# Patient Record
Sex: MALE | Race: OTHER | NOT HISPANIC OR LATINO | Employment: UNEMPLOYED | URBAN - NONMETROPOLITAN AREA
[De-identification: names, ages, dates, MRNs, and addresses within clinical notes are randomized per-mention and may not be internally consistent; named-entity substitution may affect disease eponyms.]

---

## 2024-04-23 ENCOUNTER — HOSPITAL ENCOUNTER (EMERGENCY)
Facility: HOSPITAL | Age: 39
Discharge: HOME | End: 2024-04-24
Attending: EMERGENCY MEDICINE
Payer: COMMERCIAL

## 2024-04-23 ENCOUNTER — APPOINTMENT (EMERGENCY)
Dept: RADIOLOGY | Facility: HOSPITAL | Age: 39
End: 2024-04-23
Payer: COMMERCIAL

## 2024-04-23 DIAGNOSIS — J18.9 PNEUMONIA: ICD-10-CM

## 2024-04-23 DIAGNOSIS — R53.1 WEAKNESS: Primary | ICD-10-CM

## 2024-04-23 DIAGNOSIS — R05.9 COUGH: ICD-10-CM

## 2024-04-23 DIAGNOSIS — R50.9 FEVER: ICD-10-CM

## 2024-04-23 LAB
ALBUMIN SERPL BCP-MCNC: 4.1 G/DL (ref 3.5–5)
ALP SERPL-CCNC: 71 U/L (ref 34–104)
ALT SERPL W P-5'-P-CCNC: 7 U/L (ref 7–52)
ANION GAP SERPL CALCULATED.3IONS-SCNC: 9 MMOL/L (ref 4–13)
APTT PPP: 34 SECONDS (ref 23–37)
AST SERPL W P-5'-P-CCNC: 9 U/L (ref 13–39)
B-OH-BUTYR SERPL-MCNC: 0.15 MMOL/L (ref 0.02–0.27)
BASOPHILS # BLD AUTO: 0.01 THOUSANDS/ÂΜL (ref 0–0.1)
BASOPHILS NFR BLD AUTO: 0 % (ref 0–1)
BILIRUB SERPL-MCNC: 0.68 MG/DL (ref 0.2–1)
BUN SERPL-MCNC: 10 MG/DL (ref 5–25)
CALCIUM SERPL-MCNC: 8.8 MG/DL (ref 8.4–10.2)
CHLORIDE SERPL-SCNC: 98 MMOL/L (ref 96–108)
CO2 SERPL-SCNC: 24 MMOL/L (ref 21–32)
CREAT SERPL-MCNC: 0.83 MG/DL (ref 0.6–1.3)
EOSINOPHIL # BLD AUTO: 0 THOUSAND/ÂΜL (ref 0–0.61)
EOSINOPHIL NFR BLD AUTO: 0 % (ref 0–6)
ERYTHROCYTE [DISTWIDTH] IN BLOOD BY AUTOMATED COUNT: 12.3 % (ref 11.6–15.1)
FLUAV RNA RESP QL NAA+PROBE: NEGATIVE
FLUBV RNA RESP QL NAA+PROBE: NEGATIVE
GFR SERPL CREATININE-BSD FRML MDRD: 110 ML/MIN/1.73SQ M
GLUCOSE SERPL-MCNC: 302 MG/DL (ref 65–140)
GLUCOSE SERPL-MCNC: 312 MG/DL (ref 65–140)
HCT VFR BLD AUTO: 40 % (ref 36.5–49.3)
HGB BLD-MCNC: 13.3 G/DL (ref 12–17)
IMM GRANULOCYTES # BLD AUTO: 0.04 THOUSAND/UL (ref 0–0.2)
IMM GRANULOCYTES NFR BLD AUTO: 0 % (ref 0–2)
INR PPP: 1.02 (ref 0.84–1.19)
LACTATE SERPL-SCNC: 1.4 MMOL/L (ref 0.5–2)
LYMPHOCYTES # BLD AUTO: 1.48 THOUSANDS/ÂΜL (ref 0.6–4.47)
LYMPHOCYTES NFR BLD AUTO: 13 % (ref 14–44)
MAGNESIUM SERPL-MCNC: 1.7 MG/DL (ref 1.9–2.7)
MCH RBC QN AUTO: 28.9 PG (ref 26.8–34.3)
MCHC RBC AUTO-ENTMCNC: 33.3 G/DL (ref 31.4–37.4)
MCV RBC AUTO: 87 FL (ref 82–98)
MONOCYTES # BLD AUTO: 1.21 THOUSAND/ÂΜL (ref 0.17–1.22)
MONOCYTES NFR BLD AUTO: 11 % (ref 4–12)
NEUTROPHILS # BLD AUTO: 8.72 THOUSANDS/ÂΜL (ref 1.85–7.62)
NEUTS SEG NFR BLD AUTO: 76 % (ref 43–75)
NRBC BLD AUTO-RTO: 0 /100 WBCS
PLATELET # BLD AUTO: 228 THOUSANDS/UL (ref 149–390)
PMV BLD AUTO: 10.1 FL (ref 8.9–12.7)
POTASSIUM SERPL-SCNC: 4.1 MMOL/L (ref 3.5–5.3)
PROCALCITONIN SERPL-MCNC: <0.05 NG/ML
PROT SERPL-MCNC: 6.4 G/DL (ref 6.4–8.4)
PROTHROMBIN TIME: 13.3 SECONDS (ref 11.6–14.5)
RBC # BLD AUTO: 4.61 MILLION/UL (ref 3.88–5.62)
RSV RNA RESP QL NAA+PROBE: NEGATIVE
SARS-COV-2 RNA RESP QL NAA+PROBE: NEGATIVE
SODIUM SERPL-SCNC: 131 MMOL/L (ref 135–147)
WBC # BLD AUTO: 11.46 THOUSAND/UL (ref 4.31–10.16)

## 2024-04-23 PROCEDURE — 80053 COMPREHEN METABOLIC PANEL: CPT | Performed by: EMERGENCY MEDICINE

## 2024-04-23 PROCEDURE — 82010 KETONE BODYS QUAN: CPT | Performed by: EMERGENCY MEDICINE

## 2024-04-23 PROCEDURE — 96360 HYDRATION IV INFUSION INIT: CPT

## 2024-04-23 PROCEDURE — 87040 BLOOD CULTURE FOR BACTERIA: CPT | Performed by: EMERGENCY MEDICINE

## 2024-04-23 PROCEDURE — 71045 X-RAY EXAM CHEST 1 VIEW: CPT

## 2024-04-23 PROCEDURE — 85730 THROMBOPLASTIN TIME PARTIAL: CPT | Performed by: EMERGENCY MEDICINE

## 2024-04-23 PROCEDURE — 83605 ASSAY OF LACTIC ACID: CPT | Performed by: EMERGENCY MEDICINE

## 2024-04-23 PROCEDURE — 99285 EMERGENCY DEPT VISIT HI MDM: CPT

## 2024-04-23 PROCEDURE — 84145 PROCALCITONIN (PCT): CPT | Performed by: EMERGENCY MEDICINE

## 2024-04-23 PROCEDURE — 99285 EMERGENCY DEPT VISIT HI MDM: CPT | Performed by: EMERGENCY MEDICINE

## 2024-04-23 PROCEDURE — 96361 HYDRATE IV INFUSION ADD-ON: CPT

## 2024-04-23 PROCEDURE — 93005 ELECTROCARDIOGRAM TRACING: CPT

## 2024-04-23 PROCEDURE — 82948 REAGENT STRIP/BLOOD GLUCOSE: CPT

## 2024-04-23 PROCEDURE — 85025 COMPLETE CBC W/AUTO DIFF WBC: CPT | Performed by: EMERGENCY MEDICINE

## 2024-04-23 PROCEDURE — 85610 PROTHROMBIN TIME: CPT | Performed by: EMERGENCY MEDICINE

## 2024-04-23 PROCEDURE — 83735 ASSAY OF MAGNESIUM: CPT | Performed by: EMERGENCY MEDICINE

## 2024-04-23 PROCEDURE — 36415 COLL VENOUS BLD VENIPUNCTURE: CPT | Performed by: EMERGENCY MEDICINE

## 2024-04-23 PROCEDURE — 0241U HB NFCT DS VIR RESP RNA 4 TRGT: CPT | Performed by: EMERGENCY MEDICINE

## 2024-04-23 RX ORDER — ACETAMINOPHEN 325 MG/1
975 TABLET ORAL ONCE
Status: COMPLETED | OUTPATIENT
Start: 2024-04-23 | End: 2024-04-23

## 2024-04-23 RX ADMIN — ACETAMINOPHEN 975 MG: 325 TABLET, FILM COATED ORAL at 22:18

## 2024-04-23 RX ADMIN — SODIUM CHLORIDE 1000 ML: 0.9 INJECTION, SOLUTION INTRAVENOUS at 22:14

## 2024-04-24 VITALS
RESPIRATION RATE: 21 BRPM | HEART RATE: 96 BPM | SYSTOLIC BLOOD PRESSURE: 109 MMHG | OXYGEN SATURATION: 94 % | DIASTOLIC BLOOD PRESSURE: 70 MMHG | TEMPERATURE: 101.3 F

## 2024-04-24 LAB
ATRIAL RATE: 115 BPM
BACTERIA UR QL AUTO: NORMAL /HPF
BILIRUB UR QL STRIP: NEGATIVE
CLARITY UR: CLEAR
COLOR UR: YELLOW
GLUCOSE UR STRIP-MCNC: ABNORMAL MG/DL
HGB UR QL STRIP.AUTO: NEGATIVE
KETONES UR STRIP-MCNC: ABNORMAL MG/DL
LEUKOCYTE ESTERASE UR QL STRIP: NEGATIVE
NITRITE UR QL STRIP: NEGATIVE
NON-SQ EPI CELLS URNS QL MICRO: NORMAL /HPF
P AXIS: 65 DEGREES
PH UR STRIP.AUTO: 6 [PH]
PR INTERVAL: 130 MS
PROT UR STRIP-MCNC: ABNORMAL MG/DL
QRS AXIS: 92 DEGREES
QRSD INTERVAL: 80 MS
QT INTERVAL: 318 MS
QTC INTERVAL: 439 MS
RBC #/AREA URNS AUTO: NORMAL /HPF
SP GR UR STRIP.AUTO: 1.01 (ref 1–1.03)
T WAVE AXIS: 21 DEGREES
UROBILINOGEN UR STRIP-ACNC: <2 MG/DL
VENTRICULAR RATE: 115 BPM
WBC #/AREA URNS AUTO: NORMAL /HPF

## 2024-04-24 PROCEDURE — 81001 URINALYSIS AUTO W/SCOPE: CPT | Performed by: EMERGENCY MEDICINE

## 2024-04-24 PROCEDURE — 93010 ELECTROCARDIOGRAM REPORT: CPT | Performed by: INTERNAL MEDICINE

## 2024-04-24 RX ORDER — ACETAMINOPHEN 500 MG
500 TABLET ORAL EVERY 6 HOURS PRN
Qty: 30 TABLET | Refills: 0 | Status: SHIPPED | OUTPATIENT
Start: 2024-04-24

## 2024-04-24 RX ORDER — AZITHROMYCIN 250 MG/1
500 TABLET, FILM COATED ORAL ONCE
Status: COMPLETED | OUTPATIENT
Start: 2024-04-24 | End: 2024-04-24

## 2024-04-24 RX ORDER — AZITHROMYCIN 250 MG/1
TABLET, FILM COATED ORAL
Qty: 4 TABLET | Refills: 0 | Status: SHIPPED | OUTPATIENT
Start: 2024-04-25 | End: 2024-04-28

## 2024-04-24 RX ADMIN — AZITHROMYCIN DIHYDRATE 500 MG: 250 TABLET ORAL at 01:23

## 2024-04-24 NOTE — ED PROVIDER NOTES
History  Chief Complaint   Patient presents with    Weakness - Generalized     Patient states he's been feeling weak for the last few days. Patient complains of fever and cough. Patient has a hx of diabetes and has not been able to get a PCP yet so has been taking his friends metformin. Patient denies any pain anywhere     39-year-old male with past medical history of diabetes who presents for weakness for the past several days.  He reports that he has had fevers, cough, chest pain only when he coughs.  Denies shortness of breath.  He has had some nausea and decreased appetite.  No abdominal pain however has had diarrhea.  Denies any dysuria or frequency.  His blood sugars have been high.  Denies any leg pain or swelling.  Review of systems otherwise negative        Prior to Admission Medications   Prescriptions Last Dose Informant Patient Reported? Taking?   metFORMIN (GLUCOPHAGE) 500 mg tablet   Yes Yes   Sig: Take 1 tablet by mouth 2 (two) times a day with meals      Facility-Administered Medications: None       Past Medical History:   Diagnosis Date    Diabetes mellitus (HCC)        History reviewed. No pertinent surgical history.    History reviewed. No pertinent family history.  I have reviewed and agree with the history as documented.    E-Cigarette/Vaping     E-Cigarette/Vaping Substances     Social History     Tobacco Use    Smoking status: Never    Smokeless tobacco: Never   Substance Use Topics    Alcohol use: Never    Drug use: Never       Review of Systems   Constitutional:  Positive for fever. Negative for chills, diaphoresis and fatigue.   HENT:  Negative for congestion and sore throat.    Eyes:  Negative for visual disturbance.   Respiratory:  Positive for cough. Negative for chest tightness and shortness of breath.    Cardiovascular:  Negative for chest pain, palpitations and leg swelling.   Gastrointestinal:  Negative for abdominal distention, abdominal pain, constipation, diarrhea, nausea and  vomiting.   Genitourinary:  Negative for difficulty urinating and dysuria.   Musculoskeletal:  Negative for arthralgias and myalgias.   Skin:  Negative for rash.   Neurological:  Positive for weakness. Negative for dizziness, light-headedness, numbness and headaches.   Psychiatric/Behavioral:  Negative for agitation, behavioral problems and confusion. The patient is not nervous/anxious.    All other systems reviewed and are negative.      Physical Exam  Physical Exam  Constitutional:       Appearance: He is well-developed.   HENT:      Head: Normocephalic and atraumatic.   Cardiovascular:      Rate and Rhythm: Normal rate and regular rhythm.      Heart sounds: Normal heart sounds. No murmur heard.  Pulmonary:      Effort: Pulmonary effort is normal. No respiratory distress.      Breath sounds: Normal breath sounds.   Abdominal:      General: Bowel sounds are normal. There is no distension.      Palpations: Abdomen is soft.      Tenderness: There is no abdominal tenderness.   Musculoskeletal:         General: No deformity.   Skin:     General: Skin is warm.      Findings: No rash.   Neurological:      Mental Status: He is alert and oriented to person, place, and time.   Psychiatric:         Behavior: Behavior normal.         Thought Content: Thought content normal.         Judgment: Judgment normal.         Vital Signs  ED Triage Vitals   Temperature Pulse Respirations Blood Pressure SpO2   04/23/24 2136 04/23/24 2136 04/23/24 2136 04/23/24 2136 04/23/24 2136   (!) 101.3 °F (38.5 °C) (!) 121 18 145/87 97 %      Temp Source Heart Rate Source Patient Position - Orthostatic VS BP Location FiO2 (%)   04/23/24 2136 04/23/24 2136 04/24/24 0000 04/24/24 0000 --   Tympanic Monitor Lying Right arm       Pain Score       04/23/24 2136       No Pain           Vitals:    04/23/24 2230 04/23/24 2300 04/23/24 2345 04/24/24 0000   BP: 124/82 105/72  109/70   Pulse: (!) 106 104 98 96   Patient Position - Orthostatic VS:    Lying          Visual Acuity      ED Medications  Medications   sodium chloride 0.9 % bolus 1,000 mL (0 mL Intravenous Stopped 4/24/24 0000)   acetaminophen (TYLENOL) tablet 975 mg (975 mg Oral Given 4/23/24 2218)   azithromycin (ZITHROMAX) tablet 500 mg (500 mg Oral Given 4/24/24 0123)       Diagnostic Studies  Results Reviewed       Procedure Component Value Units Date/Time    Blood culture #1 [[id number]] Collected: 04/23/24 2223    Lab Status: Preliminary result Specimen: Blood from Arm, Left Updated: 04/24/24 1002     Blood Culture Received in Microbiology Lab. Culture in Progress.    Blood culture #2 [[id number]] Collected: 04/23/24 2211    Lab Status: Preliminary result Specimen: Blood from Arm, Right Updated: 04/24/24 1002     Blood Culture Received in Microbiology Lab. Culture in Progress.    Urine Microscopic [[id number]]  (Normal) Collected: 04/24/24 0002    Lab Status: Final result Specimen: Urine, Other Updated: 04/24/24 0108     RBC, UA 0-1 /hpf      WBC, UA 0-1 /hpf      Epithelial Cells Occasional /hpf      Bacteria, UA Occasional /hpf     UA w Reflex to Microscopic w Reflex to Culture [[id number]]  (Abnormal) Collected: 04/24/24 0002    Lab Status: Final result Specimen: Urine, Other Updated: 04/24/24 0102     Color, UA Yellow     Clarity, UA Clear     Specific Gravity, UA 1.010     pH, UA 6.0     Leukocytes, UA Negative     Nitrite, UA Negative     Protein, UA Trace mg/dl      Glucose, UA 1000 (1%) mg/dl      Ketones, UA 10 (1+) mg/dl      Urobilinogen, UA <2.0 mg/dl      Bilirubin, UA Negative     Occult Blood, UA Negative    FLU/RSV/COVID - if FLU/RSV clinically relevant [[id number]]  (Normal) Collected: 04/23/24 2211    Lab Status: Final result Specimen: Nares from Nose Updated: 04/23/24 2306     SARS-CoV-2 Negative     INFLUENZA A PCR Negative     INFLUENZA B PCR Negative     RSV PCR Negative    Narrative:      FOR PEDIATRIC PATIENTS - copy/paste COVID Guidelines URL to browser:  [email or web address]    SARS-CoV-2 assay is a Nucleic Acid Amplification assay intended for the  qualitative detection of nucleic acid from SARS-CoV-2 in nasopharyngeal  swabs. Results are for the presumptive identification of SARS-CoV-2 RNA.    Positive results are indicative of infection with SARS-CoV-2, the virus  causing COVID-19, but do not rule out bacterial infection or co-infection  with other viruses. Laboratories within the [place] and its  territories are required to report all positive results to the appropriate  public health authorities. Negative results do not preclude SARS-CoV-2  infection and should not be used as the sole basis for treatment or other  patient management decisions. Negative results must be combined with  clinical observations, patient history, and epidemiological information.  This test has not been FDA cleared or approved.    This test has been authorized by FDA under an Emergency Use Authorization  (EUA). This test is only authorized for the duration of time the  declaration that circumstances exist justifying the authorization of the  emergency use of an in vitro diagnostic tests for detection of SARS-CoV-2  virus and/or diagnosis of COVID-19 infection under section 564(b)(1) of  the Act, 21 U.S.C. 360bbb-3(b)(1), unless the authorization is terminated  or revoked sooner. The test has been validated but independent review by FDA  and CLIA is pending.    Test performed using [employer] GeneXpert: This RT-PCR assay targets N2,  a region unique to SARS-CoV-2. A conserved region in the E-gene was chosen  for pan-Sarbecovirus detection which includes SARS-CoV-2.    According to CMS-2020-01-R, this platform meets the definition of high-throughput technology.    Procalcitonin [[id number]]  (Normal) Collected: 04/23/24 2211    Lab Status: Final result Specimen: Blood from Arm, Right Updated: 04/23/24 2251     Procalcitonin <0.05  ng/ml     Lactic acid [[id number]]  (Normal) Collected: 04/23/24 2211    Lab Status: Final result Specimen: Blood from Arm, Right Updated: 04/23/24 2246     LACTIC ACID 1.4 mmol/L     Narrative:      Result may be elevated if tourniquet was used during collection.    Comprehensive metabolic panel [[id number]]  (Abnormal) Collected: 04/23/24 2211    Lab Status: Final result Specimen: Blood from Arm, Right Updated: 04/23/24 2246     Sodium 131 mmol/L      Potassium 4.1 mmol/L      Chloride 98 mmol/L      CO2 24 mmol/L      ANION GAP 9 mmol/L      BUN 10 mg/dL      Creatinine 0.83 mg/dL      Glucose 302 mg/dL      Calcium 8.8 mg/dL      AST 9 U/L      ALT 7 U/L      Alkaline Phosphatase 71 U/L      Total Protein 6.4 g/dL      Albumin 4.1 g/dL      Total Bilirubin 0.68 mg/dL      eGFR 110 ml/min/1.73sq m     Narrative:      National Kidney Disease Foundation guidelines for Chronic Kidney Disease (CKD):     Stage 1 with normal or high GFR (GFR > 90 mL/min/1.73 square meters)    Stage 2 Mild CKD (GFR = 60-89 mL/min/1.73 square meters)    Stage 3A Moderate CKD (GFR = 45-59 mL/min/1.73 square meters)    Stage 3B Moderate CKD (GFR = 30-44 mL/min/1.73 square meters)    Stage 4 Severe CKD (GFR = 15-29 mL/min/1.73 square meters)    Stage 5 End Stage CKD (GFR <15 mL/min/1.73 square meters)  Note: GFR calculation is accurate only with a steady state creatinine    Beta Hydroxybutyrate [[id number]]  (Normal) Collected: 04/23/24 2211    Lab Status: Final result Specimen: Blood from Arm, Right Updated: 04/23/24 2246     Beta- Hydroxybutyrate 0.15 mmol/L     Magnesium [[id number]]  (Abnormal) Collected: 04/23/24 2211    Lab Status: Final result Specimen: Blood from Arm, Right Updated: 04/23/24 2246     Magnesium 1.7 mg/dL     Protime-INR [[id number]]  (Normal) Collected: 04/23/24 2211    Lab Status: Final result Specimen: Blood from Arm, Right Updated: 04/23/24 2238     Protime 13.3 seconds      INR 1.02    APTT [[id number]]  (Normal)  Collected: 04/23/24 2211    Lab Status: Final result Specimen: Blood from Arm, Right Updated: 04/23/24 2238     PTT 34 seconds     CBC and differential [[id number]]  (Abnormal) Collected: 04/23/24 2211    Lab Status: Final result Specimen: Blood from Arm, Right Updated: 04/23/24 2228     WBC 11.46 Thousand/uL      RBC 4.61 Million/uL      Hemoglobin 13.3 g/dL      Hematocrit 40.0 %      MCV 87 fL      MCH 28.9 pg      MCHC 33.3 g/dL      RDW 12.3 %      MPV 10.1 fL      Platelets 228 Thousands/uL      nRBC 0 /100 WBCs      Segmented % 76 %      Immature Grans % 0 %      Lymphocytes % 13 %      Monocytes % 11 %      Eosinophils Relative 0 %      Basophils Relative 0 %      Absolute Neutrophils 8.72 Thousands/µL      Absolute Immature Grans 0.04 Thousand/uL      Absolute Lymphocytes 1.48 Thousands/µL      Absolute Monocytes 1.21 Thousand/µL      Eosinophils Absolute 0.00 Thousand/µL      Basophils Absolute 0.01 Thousands/µL     Fingerstick Glucose (POCT) [[id number]]  (Abnormal) Collected: 04/23/24 2143    Lab Status: Final result Specimen: Blood Updated: 04/23/24 2144     POC Glucose 312 mg/dl                    XR chest portable   Final Result by Drew Hester MD (04/24 1410)      No acute cardiopulmonary disease.            Workstation performed: [id number]                    Procedures  ECG 12 Lead Documentation Only    Date/Time: 4/23/2024 10:12 PM    Performed by: Angel Stout MD  Authorized by: Angel Stout MD    Indications / Diagnosis:  Weakness  ECG reviewed by me, the ED Provider: yes    Patient location:  ED  Previous ECG:     Previous ECG:  Compared to current    Similarity:  Changes noted  Interpretation:     Interpretation: abnormal    Rate:     ECG rate:  115    ECG rate assessment: tachycardic    Rhythm:     Rhythm: sinus rhythm    Ectopy:     Ectopy: PVCs    QRS:     QRS axis:  Right    QRS intervals:  Normal  Conduction:     Conduction: normal    ST segments:      ST segments:  Normal  T waves:     T waves: non-specific             ED Course  ED Course as of 04/24/24 2133   Tue Apr 23, 2024   2249 Beta- Hydroxybutyrate: 0.15  Inconsistent with DKA   2250 Sodium(!): 131  Pseudohyponatremia in setting of hyperglycemia.   2254 LACTIC ACID: 1.4  Normal, reassuring.   Wed Apr 24, 2024   0126 MAGNESIUM(!): 1.7  Minimally decreased, unlikely to cause pt's sx   0126 GLUCOSE(!): 302  Mild hyperglycemia, known to pt.                               SBIRT 20yo+      Flowsheet Row Most Recent Value   Initial Alcohol Screen: US AUDIT-C     1. How often do you have a drink containing alcohol? 0 Filed at: 04/23/2024 2135   2. How many drinks containing alcohol do you have on a typical day you are drinking?  0 Filed at: 04/23/2024 2135   3a. Male UNDER 65: How often do you have five or more drinks on one occasion? 0 Filed at: 04/23/2024 2135   3b. FEMALE Any Age, or MALE 65+: How often do you have 4 or more drinks on one occassion? 0 Filed at: 04/23/2024 2135   Audit-C Score 0 Filed at: 04/23/2024 2135   IGNACIO: How many times in the past year have you...    Used an illegal drug or used a prescription medication for non-medical reasons? Never Filed at: 04/23/2024 2135                      Medical Decision Making  I reviewed the patient's medical chart, PMHx, prior encounters, medications.    My independent interpretation of ECG demonstrated: Tachycardic, sinus rhythm, no acute ischemic changes, no PVCs.  My independent interpretation of CXR demonstrated: Possible haziness in R lower lung    My DDx includes: Viral syndrome, DKA, dehydration, electrolyte abnormality. At risk for pneumonia, UTI    Will perform DKA workup, viral panel, broads labs. Will give fluids, tylenol, reassess.    Pt has known hyperglycemia. I advised him to follow up with PCP which patient states he is working towards. Provided [facility] information.    No other infectious source found, suspect  possible small pneumonia vs viral syndrome. Will start azithromycin, initial dose given here.    Will recommend tylenol for fever control. Patient is well-appearing on reassessment in no distress. Comfortable with plan for followup.    Discharged with strict return precautions.    Amount and/or Complexity of Data Reviewed  Labs: ordered. Decision-making details documented in ED Course.  Radiology: ordered.    Risk  OTC drugs.  Prescription drug management.             Disposition  Final diagnoses:   Weakness   Fever   Cough   Pneumonia     Time reflects when diagnosis was documented in both MDM as applicable and the Disposition within this note       Time User Action Codes Description Comment    4/24/2024  1:03 AM Angel Stout Add [R53.1] Weakness     4/24/2024  1:03 AM Angel Stout Add [R50.9] Fever     4/24/2024  1:03 AM Angel Stout Add [R05.9] Cough     4/24/2024  1:12 AM Angel Stout Add [J18.9] Pneumonia           ED Disposition       ED Disposition   Discharge    Condition   Stable    Date/Time   Wed Apr 24, 2024 0112    Comment   Rony Barrera discharge to home/self care.                   Follow-up Information       Follow up With Specialties Details Why Contact Info Additional Information    [facility] Call  For re-evaluation [address] [place] [id number] [phone] [facility] [phone]            Discharge Medication List as of 4/24/2024  1:12 AM        START taking these medications    Details   acetaminophen (TYLENOL) 500 mg tablet Take 1 tablet (500 mg total) by mouth every 6 (six) hours as needed for fever, Starting Wed 4/24/2024, Normal      azithromycin (ZITHROMAX) 250 mg tablet 1 tablet daily x 4 days Do not start before April 25, 2024., Normal           CONTINUE these medications which have NOT CHANGED    Details   metFORMIN (GLUCOPHAGE) 500 mg tablet Take 1 tablet by mouth 2  (two) times a day with meals, Starting Wed 12/27/2023, Historical Med             No discharge procedures on file.    PDMP Review       None            ED Provider  Electronically Signed by             Angel Stout MD  04/24/24 9914

## 2024-04-24 NOTE — DISCHARGE INSTRUCTIONS
Please establish care with PCP, [facility] information provided.    Please start antibiotics. Take tylenol for fever control.    Follow all return precautions.

## 2024-04-29 LAB
BACTERIA BLD CULT: NORMAL
BACTERIA BLD CULT: NORMAL

## 2025-04-27 ENCOUNTER — HOSPITAL ENCOUNTER (EMERGENCY)
Facility: HOSPITAL | Age: 40
Discharge: HOME | End: 2025-04-27
Payer: COMMERCIAL

## 2025-04-27 VITALS
DIASTOLIC BLOOD PRESSURE: 82 MMHG | RESPIRATION RATE: 17 BRPM | OXYGEN SATURATION: 99 % | TEMPERATURE: 97.9 F | HEART RATE: 84 BPM | WEIGHT: 150 LBS | SYSTOLIC BLOOD PRESSURE: 117 MMHG

## 2025-04-27 DIAGNOSIS — E11.65 HYPERGLYCEMIA DUE TO TYPE 2 DIABETES MELLITUS (HCC): ICD-10-CM

## 2025-04-27 DIAGNOSIS — S60.521A BLISTER OF RIGHT HAND, INITIAL ENCOUNTER: Primary | ICD-10-CM

## 2025-04-27 LAB
ANION GAP SERPL CALCULATED.3IONS-SCNC: 7 MMOL/L (ref 4–13)
B-OH-BUTYR SERPL-MCNC: <0.05 MMOL/L (ref 0.02–0.27)
BASE EX.OXY STD BLDV CALC-SCNC: 71.2 % (ref 60–80)
BASE EXCESS BLDV CALC-SCNC: 1.7 MMOL/L
BASOPHILS # BLD AUTO: 0.02 THOUSANDS/ÂΜL (ref 0–0.1)
BASOPHILS NFR BLD AUTO: 0 % (ref 0–1)
BUN SERPL-MCNC: 17 MG/DL (ref 5–25)
CALCIUM SERPL-MCNC: 9.4 MG/DL (ref 8.4–10.2)
CHLORIDE SERPL-SCNC: 99 MMOL/L (ref 96–108)
CO2 SERPL-SCNC: 30 MMOL/L (ref 21–32)
CREAT SERPL-MCNC: 0.77 MG/DL (ref 0.6–1.3)
EOSINOPHIL # BLD AUTO: 0.28 THOUSAND/ÂΜL (ref 0–0.61)
EOSINOPHIL NFR BLD AUTO: 4 % (ref 0–6)
ERYTHROCYTE [DISTWIDTH] IN BLOOD BY AUTOMATED COUNT: 12 % (ref 11.6–15.1)
GFR SERPL CREATININE-BSD FRML MDRD: 113 ML/MIN/1.73SQ M
GLUCOSE SERPL-MCNC: 296 MG/DL (ref 65–140)
GLUCOSE SERPL-MCNC: 325 MG/DL (ref 65–140)
HCO3 BLDV-SCNC: 28.4 MMOL/L (ref 24–30)
HCT VFR BLD AUTO: 44.4 % (ref 36.5–49.3)
HGB BLD-MCNC: 14.6 G/DL (ref 12–17)
IMM GRANULOCYTES # BLD AUTO: 0.02 THOUSAND/UL (ref 0–0.2)
IMM GRANULOCYTES NFR BLD AUTO: 0 % (ref 0–2)
LYMPHOCYTES # BLD AUTO: 3.06 THOUSANDS/ÂΜL (ref 0.6–4.47)
LYMPHOCYTES NFR BLD AUTO: 41 % (ref 14–44)
MCH RBC QN AUTO: 29 PG (ref 26.8–34.3)
MCHC RBC AUTO-ENTMCNC: 32.9 G/DL (ref 31.4–37.4)
MCV RBC AUTO: 88 FL (ref 82–98)
MONOCYTES # BLD AUTO: 0.7 THOUSAND/ÂΜL (ref 0.17–1.22)
MONOCYTES NFR BLD AUTO: 9 % (ref 4–12)
NEUTROPHILS # BLD AUTO: 3.42 THOUSANDS/ÂΜL (ref 1.85–7.62)
NEUTS SEG NFR BLD AUTO: 46 % (ref 43–75)
NRBC BLD AUTO-RTO: 0 /100 WBCS
O2 CT BLDV-SCNC: 15 ML/DL
PCO2 BLDV: 52.4 MM HG (ref 42–50)
PH BLDV: 7.35 [PH] (ref 7.3–7.4)
PLATELET # BLD AUTO: 293 THOUSANDS/UL (ref 149–390)
PMV BLD AUTO: 10.4 FL (ref 8.9–12.7)
PO2 BLDV: 39.7 MM HG (ref 35–45)
POTASSIUM SERPL-SCNC: 4.4 MMOL/L (ref 3.5–5.3)
RBC # BLD AUTO: 5.03 MILLION/UL (ref 3.88–5.62)
SODIUM SERPL-SCNC: 136 MMOL/L (ref 135–147)
WBC # BLD AUTO: 7.5 THOUSAND/UL (ref 4.31–10.16)

## 2025-04-27 PROCEDURE — 90471 IMMUNIZATION ADMIN: CPT

## 2025-04-27 PROCEDURE — 99284 EMERGENCY DEPT VISIT MOD MDM: CPT

## 2025-04-27 PROCEDURE — 96360 HYDRATION IV INFUSION INIT: CPT

## 2025-04-27 PROCEDURE — 90715 TDAP VACCINE 7 YRS/> IM: CPT

## 2025-04-27 PROCEDURE — 82010 KETONE BODYS QUAN: CPT

## 2025-04-27 PROCEDURE — 36415 COLL VENOUS BLD VENIPUNCTURE: CPT

## 2025-04-27 PROCEDURE — 80048 BASIC METABOLIC PNL TOTAL CA: CPT

## 2025-04-27 PROCEDURE — 82948 REAGENT STRIP/BLOOD GLUCOSE: CPT

## 2025-04-27 PROCEDURE — 85025 COMPLETE CBC W/AUTO DIFF WBC: CPT

## 2025-04-27 PROCEDURE — 82805 BLOOD GASES W/O2 SATURATION: CPT

## 2025-04-27 RX ORDER — DOXYCYCLINE 100 MG/1
100 CAPSULE ORAL ONCE
Status: COMPLETED | OUTPATIENT
Start: 2025-04-27 | End: 2025-04-27

## 2025-04-27 RX ORDER — DOXYCYCLINE 100 MG/1
100 CAPSULE ORAL 2 TIMES DAILY
Qty: 14 CAPSULE | Refills: 0 | Status: SHIPPED | OUTPATIENT
Start: 2025-04-27 | End: 2025-05-04

## 2025-04-27 RX ADMIN — SODIUM CHLORIDE 1000 ML: 0.9 INJECTION, SOLUTION INTRAVENOUS at 15:19

## 2025-04-27 RX ADMIN — TETANUS TOXOID, REDUCED DIPHTHERIA TOXOID AND ACELLULAR PERTUSSIS VACCINE, ADSORBED 0.5 ML: 5; 2.5; 8; 8; 2.5 SUSPENSION INTRAMUSCULAR at 15:19

## 2025-04-27 RX ADMIN — DOXYCYCLINE HYCLATE 100 MG: 100 CAPSULE ORAL at 15:20

## 2025-04-27 NOTE — ED PROVIDER NOTES
"Time reflects when diagnosis was documented in both MDM as applicable and the Disposition within this note       Time User Action Codes Description Comment    4/27/2025  3:07 PM Nancy Curry Add [S60.521A] Blister of right hand, initial encounter     4/27/2025  3:07 PM Nancy Curry Add [E11.65] Hyperglycemia due to type 2 diabetes mellitus (HCC)           ED Disposition       ED Disposition   Discharge    Condition   Stable    Date/Time   Sun Apr 27, 2025  3:51 PM    Comment   Uribe Butt discharge to home/self care.                   Assessment & Plan       Medical Decision Making  40-year-old male with history of type 2 diabetes on metformin presents the emergency department for evaluation of a blister over his third knuckle of the right hand.  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  So he used an \"abrasive rag\" to wipe the pain off of them.  He is not sure if the rag had some substance on him and irritated his skin or if he was rubbing for too long and too hard onto the skin which caused the blister.  He says it has gotten slightly bigger over the last week and he does feel some tightness when he tries to make a fist but he otherwise does not have any pain or surrounding erythema, he has not had any pus drained from it, he has no numbness or tingling in the fingers,, no fevers.  He also states that he ran out of his metformin 1 week ago, so he has been taking his brothers and his uncles so he has not missed a dose but he does need a refill of his prescription.  He denies any abdominal pain, nausea, vomiting, reports normal urinary output.  States he does not check his blood sugar at home.    Exam is notable for a circular blister over the dorsal aspect of the right MCP between the 2nd and 3rd joint space, without overlying signs of infection.  Patient is neurovascularly intact.  Point-of-care glucose here in the 300s, so did progress with a laboratory workup " to evaluate for signs of DKA/HHS, however laboratory workup here was reassuring.  Will start patient on antibiotic given his immunocompromise status as a diabetic, gave him his first dose here in the ED.  Discussed local wound care at home and encouraged him to meet with his PCP for reevaluation within the next week.  Provided him with strict return precautions.  Discharged in stable condition.    Amount and/or Complexity of Data Reviewed  Labs: ordered. Decision-making details documented in ED Course.    Risk  Prescription drug management.        ED Course as of 04/27/25 1759   Sun Apr 27, 2025   1509 POC Glucose(!): 325   1530 CBC grossly within normal limits.  No leukocytosis, H&H is stable.   1544 pH, Ezio: 7.352  wnl       Medications   tetanus-diphtheria-acellular pertussis (BOOSTRIX) IM injection 0.5 mL (0.5 mL Intramuscular Given 4/27/25 1519)   doxycycline hyclate (VIBRAMYCIN) capsule 100 mg (100 mg Oral Given 4/27/25 1520)   sodium chloride 0.9 % bolus 1,000 mL (0 mL Intravenous Stopped 4/27/25 1619)       ED Risk Strat Scores                    No data recorded        SBIRT 22yo+      Flowsheet Row Most Recent Value   Initial Alcohol Screen: US AUDIT-C     3a. Male UNDER 65: How often do you have five or more drinks on one occasion? 0 Filed at: 04/27/2025 1447   Audit-C Score 0 Filed at: 04/27/2025 1447   IGNACIO: How many times in the past year have you...    Used an illegal drug or used a prescription medication for non-medical reasons? Never Filed at: 04/27/2025 1447                            History of Present Illness       Chief Complaint   Patient presents with    Hyperglycemia - Symptomatic     Pt has high blood sugar and has been out of his metformin for 1 week. Also has a blister on posterior portion of right hand       Past Medical History:   Diagnosis Date    Diabetes mellitus (HCC)       History reviewed. No pertinent surgical history.   History reviewed. No pertinent family history.   Social  "History     Tobacco Use    Smoking status: Never    Smokeless tobacco: Never   Substance Use Topics    Alcohol use: Never    Drug use: Never      E-Cigarette/Vaping      E-Cigarette/Vaping Substances      I have reviewed and agree with the history as documented.     40-year-old male with history of type 2 diabetes on metformin presents the emergency department for evaluation of a blister over his third knuckle of the right hand.  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  So he used an \"abrasive rag\" to wipe the pain off of them.  He is not sure if the rag had some substance on him and irritated his skin or if he was rubbing for too long and too hard onto the skin which caused the blister.  He says it has gotten slightly bigger over the last week and he does feel some tightness when he tries to make a fist but he otherwise does not have any pain or surrounding erythema, he has not had any pus drained from it, he has no numbness or tingling in the fingers,, no fevers.  He also states that he ran out of his metformin 1 week ago, so he has been taking his brothers and his uncles so he has not missed a dose but he does need a refill of his prescription.  He denies any abdominal pain, nausea, vomiting, reports normal urinary output.  States he does not check his blood sugar at home.          Review of Systems   Constitutional:  Negative for chills and fever.   HENT:  Negative for ear pain and sore throat.    Eyes:  Negative for pain and visual disturbance.   Respiratory:  Negative for cough and shortness of breath.    Cardiovascular:  Negative for chest pain and leg swelling.   Gastrointestinal:  Negative for abdominal pain, blood in stool, constipation, diarrhea, nausea and vomiting.   Genitourinary:  Negative for dysuria and hematuria.   Musculoskeletal:  Negative for neck pain and neck stiffness.   Skin:  Positive for wound.        Blister on the right hand   Neurological:  " Negative for dizziness, syncope, weakness and light-headedness.   All other systems reviewed and are negative.          Objective       ED Triage Vitals [04/27/25 1445]   Temperature Pulse Blood Pressure Respirations SpO2 Patient Position - Orthostatic VS   97.9 °F (36.6 °C) 99 120/83 18 95 % Sitting      Temp Source Heart Rate Source BP Location FiO2 (%) Pain Score    Temporal Monitor Right arm -- 4      Vitals      Date and Time Temp Pulse SpO2 Resp BP Pain Score FACES Pain Rating User   04/27/25 1600 -- 84 99 % 17 117/82 4 -- PP   04/27/25 1445 97.9 °F (36.6 °C) 99 95 % 18 120/83 4 -- KTR            Physical Exam  Constitutional:       General: He is not in acute distress.     Appearance: He is not ill-appearing.   HENT:      Head: Normocephalic and atraumatic.   Eyes:      Extraocular Movements: Extraocular movements intact.   Cardiovascular:      Rate and Rhythm: Normal rate and regular rhythm.      Pulses: Normal pulses.      Heart sounds: Normal heart sounds. No murmur heard.  Pulmonary:      Effort: Pulmonary effort is normal. No respiratory distress.      Breath sounds: Normal breath sounds.   Abdominal:      General: There is no distension.      Palpations: Abdomen is soft.      Tenderness: There is no abdominal tenderness. There is no guarding or rebound.   Musculoskeletal:        Hands:       Comments: There is also a very small decompressed blister just lateral to the large blister, no signs of surrounding infection.   Neurological:      General: No focal deficit present.      Mental Status: He is alert and oriented to person, place, and time.                  Results Reviewed       Procedure Component Value Units Date/Time    Basic metabolic panel [[id number]]  (Abnormal) Collected: 04/27/25 1519    Lab Status: Final result Specimen: Blood from Arm, Right Updated: 04/27/25 1548     Sodium 136 mmol/L      Potassium 4.4 mmol/L      Chloride 99 mmol/L      CO2 30 mmol/L      ANION GAP 7 mmol/L      BUN 17  mg/dL      Creatinine 0.77 mg/dL      Glucose 296 mg/dL      Calcium 9.4 mg/dL      eGFR 113 ml/min/1.73sq m     Narrative:      [employer] guidelines for Chronic Kidney Disease (CKD):     Stage 1 with normal or high GFR (GFR > 90 mL/min/1.73 square meters)    Stage 2 Mild CKD (GFR = 60-89 mL/min/1.73 square meters)    Stage 3A Moderate CKD (GFR = 45-59 mL/min/1.73 square meters)    Stage 3B Moderate CKD (GFR = 30-44 mL/min/1.73 square meters)    Stage 4 Severe CKD (GFR = 15-29 mL/min/1.73 square meters)    Stage 5 End Stage CKD (GFR <15 mL/min/1.73 square meters)  Note: GFR calculation is accurate only with a steady state creatinine    Beta Hydroxybutyrate [[id number]]  (Normal) Collected: 04/27/25 1519    Lab Status: Final result Specimen: Blood from Arm, Right Updated: 04/27/25 1548     Beta- Hydroxybutyrate <0.05 mmol/L     Blood gas, venous [[id number]]  (Abnormal) Collected: 04/27/25 1519    Lab Status: Final result Specimen: Blood from Arm, Right Updated: 04/27/25 1531     pH, Ezio 7.352     pCO2, Ezio 52.4 mm Hg      pO2, Ezio 39.7 mm Hg      HCO3, Ezio 28.4 mmol/L      Base Excess, Ezio 1.7 mmol/L      O2 Content, Ezio 15.0 ml/dL      O2 HGB, VENOUS 71.2 %     CBC and differential [[id number]] Collected: 04/27/25 1519    Lab Status: Final result Specimen: Blood from Arm, Right Updated: 04/27/25 1528     WBC 7.50 Thousand/uL      RBC 5.03 Million/uL      Hemoglobin 14.6 g/dL      Hematocrit 44.4 %      MCV 88 fL      MCH 29.0 pg      MCHC 32.9 g/dL      RDW 12.0 %      MPV 10.4 fL      Platelets 293 Thousands/uL      nRBC 0 /100 WBCs      Segmented % 46 %      Immature Grans % 0 %      Lymphocytes % 41 %      Monocytes % 9 %      Eosinophils Relative 4 %      Basophils Relative 0 %      Absolute Neutrophils 3.42 Thousands/µL      Absolute Immature Grans 0.02 Thousand/uL      Absolute Lymphocytes 3.06 Thousands/µL      Absolute Monocytes 0.70 Thousand/µL      Eosinophils Absolute 0.28  Thousand/µL      Basophils Absolute 0.02 Thousands/µL     Fingerstick Glucose (POCT) [[id number]]  (Abnormal) Collected: 04/27/25 1502    Lab Status: Final result Specimen: Blood Updated: 04/27/25 1505     POC Glucose 325 mg/dl             No orders to display       Procedures    ED Medication and Procedure Management   Prior to Admission Medications   Prescriptions Last Dose Informant Patient Reported? Taking?   acetaminophen (TYLENOL) 500 mg tablet   No No   Sig: Take 1 tablet (500 mg total) by mouth every 6 (six) hours as needed for fever   metFORMIN (GLUCOPHAGE) 500 mg tablet   Yes No   Sig: Take 1 tablet by mouth 2 (two) times a day with meals Ran out 1 week ago      Facility-Administered Medications: None     Discharge Medication List as of 4/27/2025  3:52 PM        START taking these medications    Details   doxycycline hyclate (VIBRAMYCIN) 100 mg capsule Take 1 capsule (100 mg total) by mouth 2 (two) times a day for 7 days, Starting Sun 4/27/2025, Until Sun 5/4/2025, Normal      !! metFORMIN (GLUCOPHAGE) 500 mg tablet Take 1 tablet (500 mg total) by mouth 2 (two) times a day with meals, Starting Sun 4/27/2025, Normal       !! - Potential duplicate medications found. Please discuss with provider.        CONTINUE these medications which have NOT CHANGED    Details   acetaminophen (TYLENOL) 500 mg tablet Take 1 tablet (500 mg total) by mouth every 6 (six) hours as needed for fever, Starting Wed 4/24/2024, Normal      !! metFORMIN (GLUCOPHAGE) 500 mg tablet Take 1 tablet by mouth 2 (two) times a day with meals Ran out 1 week ago, Starting Wed 12/27/2023, Historical Med       !! - Potential duplicate medications found. Please discuss with provider.        No discharge procedures on file.  ED SEPSIS DOCUMENTATION   Time reflects when diagnosis was documented in both MDM as applicable and the Disposition within this note       Time User Action Codes Description Comment    4/27/2025  3:07 PM Nancy Curry Add  [D30.521H] Blister of right hand, initial encounter     4/27/2025  3:07 PM Nancy Curry Add [E11.65] Hyperglycemia due to type 2 diabetes mellitus (HCC)                  Nancy Curry MD  04/27/25 1744

## 2025-04-27 NOTE — DISCHARGE INSTRUCTIONS
Sent to antibiotic which she will take twice per day for the next week, to avoid worsening infection of the blister of your right hand.  Please come back to the emerged part and if you develop fevers, severe pain in the hand, pus draining from the wound or difficulty making a fist  Also sent metformin refill to your pharmacy, please contact your primary care physician to get a refill and set up an appointment to discuss better control of your blood sugar

## 2025-04-27 NOTE — ED ATTENDING ATTESTATION
4/27/2025  IAngel MD, saw and evaluated the patient. I have discussed the patient with the resident/non-physician practitioner and agree with the resident's/non-physician practitioner's findings, Plan of Care, and MDM as documented in the resident's/non-physician practitioner's note, except where noted. All available labs and Radiology studies were reviewed.  I was present for key portions of any procedure(s) performed by the resident/non-physician practitioner and I was immediately available to provide assistance.       At this point I agree with the current assessment done in the Emergency Department.  I have conducted an independent evaluation of this patient a history and physical is as follows:    ED Course     This is a 40-year-old male who arrives today for evaluation of higher than normal blood sugar readings (states he is out of his metformin and has been taking family members metformin for the past several days), as well as a lesion on the dorsum of his hand which is blistered and mildly painful.  Patient reports he had pain on his hand that started rubbing aggressively at the hand to try get the paint off and developed a terse blister on the hand.  This has been present for several days.  Denies any redness or warmth or swelling of the hand otherwise, normal range of motion of the hand, no fevers or chills or purulence.  Denying polyuria, polydipsia, weakness, confusion, or anxiety.      Physical Exam  Vitals and nursing note reviewed.   Constitutional:       General: He is not in acute distress.     Appearance: He is well-developed.   HENT:      Head: Normocephalic and atraumatic.   Eyes:      Conjunctiva/sclera: Conjunctivae normal.   Cardiovascular:      Rate and Rhythm: Normal rate.   Pulmonary:      Effort: Pulmonary effort is normal. No respiratory distress.   Abdominal:      General: Abdomen is flat. There is no distension.   Musculoskeletal:         General: Swelling and tenderness  present.        Hands:       Cervical back: Neck supple.      Comments: Blister approximately 1.5 cm (see picture in chart)   Skin:     General: Skin is warm and dry.      Capillary Refill: Capillary refill takes less than 2 seconds.   Neurological:      Mental Status: He is alert.   Psychiatric:         Mood and Affect: Mood normal.           Critical Care Time  Procedures    MDM  Number of Diagnoses or Management Options  Blister of right hand, initial encounter  Hyperglycemia due to type 2 diabetes mellitus (HCC)  Diagnosis management comments: Medical complexity: 40-year-old male presenting with concern for higher than normal blood sugar readings.  Will screen labs for signs of HHNK/DKA.  Denying symptoms concerning for more severe presentation of symptomatic hyperglycemia.  Will refill metformin.    The blister on his hand does not appear acutely infected however given his risk factors for opportunistic infection and immunosuppression due to his poorly controlled diabetes, will cover antibiotics as he will have an open woundblister pops.  Wound care discussions were had with the patient for how to care for the wound once it does pop.  Will cover with doxycycline in the meantime.    Disposition: Likely discharge unless patient were to meet criteria for HHNK, DKA, or severe metabolic disturbance, BOBBY, or other lab abnormality of concern.